# Patient Record
Sex: MALE | Race: WHITE | NOT HISPANIC OR LATINO
[De-identification: names, ages, dates, MRNs, and addresses within clinical notes are randomized per-mention and may not be internally consistent; named-entity substitution may affect disease eponyms.]

---

## 2021-05-10 PROBLEM — Z00.00 ENCOUNTER FOR PREVENTIVE HEALTH EXAMINATION: Status: ACTIVE | Noted: 2021-05-10

## 2023-05-05 PROBLEM — M25.512 LEFT SHOULDER PAIN: Status: ACTIVE | Noted: 2023-05-05

## 2023-05-05 NOTE — PHYSICAL EXAM
[de-identified] : Left Shoulder: No obvious deformities, atrophy, ecchymosis, or swelling/effusion. Negative tenderness to palpation of AC joint, clavicle, sternoclavicular joint, glenohumeral joint, rotator cuff, and proximal biceps/triceps. Normal active and passive range of motion, including flexion to 180 degrees, extension to 180 degrees, and internal/external rotation to 180 degrees. Negative provocative testing, including Hart, Neer's, Cross-Body Adduction, Prescott's, Speeds tests, Drop Arm, Empty Can, and Lift Off tests. Neurovascular status is intact.\par \par Otherwise, no apparent distress. Alert and oriented x 3. Normal mood and affect. No atrophy or swelling. 5 out of 5 motor strength. Sensation is intact and symmetrical. Normal deep tendon reflexes. Full range of motion of shoulder, elbows, hips, and knees (flexion, extension, and rotation). No palpatory tenderness or palpable lymph nodes. Negative straight leg raise. Normal finger-to-nose test. No pathological reflexes. Normal ambulation. No upper or lower extremity instability. [de-identified] : # views of ? are negative for any obvious fractures or dislocations. The patient understands that this is a wet read and I am not a radiologist. If the final read reveals something different than discussed in the office, then the patient will get a call back in the next 24 - 48 hours to discuss.

## 2023-05-05 NOTE — HISTORY OF PRESENT ILLNESS
[de-identified] : Mr. JERRY ELLIS is a 52 year male who presents to office complaining of left shoulder pain.\par \par All review of systems, family history, social history, surgical history, past medical history, medications, and allergies not previously stated as positive are negative. They were reviewed by me today with the patient and documented accordingly.

## 2023-05-05 NOTE — DISCUSSION/SUMMARY
[de-identified] : Diagnosis\par Medication Rx?\par Physical Therapy ordered?\par MRI ordered?\par Brace/splint/cast/crutches given?\par Cortisone injection or aspiration done?\par Pain management referral?\par Follow up with  ?\par All options discussed with patient.\par All questions by patient answered to their satisfaction.\par Patient expresses full understanding and agreement with plan.\par Patient is happy with the office visit.

## 2023-05-06 ENCOUNTER — APPOINTMENT (OUTPATIENT)
Dept: ORTHOPEDIC SURGERY | Facility: CLINIC | Age: 53
End: 2023-05-06

## 2023-05-06 DIAGNOSIS — M25.512 PAIN IN LEFT SHOULDER: ICD-10-CM

## 2023-05-11 ENCOUNTER — APPOINTMENT (OUTPATIENT)
Dept: ORTHOPEDIC SURGERY | Facility: CLINIC | Age: 53
End: 2023-05-11
Payer: COMMERCIAL

## 2023-05-11 VITALS — WEIGHT: 220 LBS | HEIGHT: 73 IN | BODY MASS INDEX: 29.16 KG/M2

## 2023-05-11 DIAGNOSIS — Z78.9 OTHER SPECIFIED HEALTH STATUS: ICD-10-CM

## 2023-05-11 DIAGNOSIS — M75.42 IMPINGEMENT SYNDROME OF LEFT SHOULDER: ICD-10-CM

## 2023-05-11 PROCEDURE — 73030 X-RAY EXAM OF SHOULDER: CPT | Mod: LT

## 2023-05-11 PROCEDURE — 20610 DRAIN/INJ JOINT/BURSA W/O US: CPT | Mod: LT

## 2023-05-11 PROCEDURE — 99203 OFFICE O/P NEW LOW 30 MIN: CPT | Mod: 25

## 2023-05-11 NOTE — DISCUSSION/SUMMARY
[de-identified] : "Written by Clementine Jaeger, acting as Scribe for Darryl Palmer MD."\par \par Dr. Palmer - \par The documentation recorded by the scribe accurately reflects the service I personally performed and the decisions made by me.

## 2023-05-11 NOTE — PHYSICAL EXAM
[NL (45)] : extension 45 degrees [NL (0-180)] : full passive forward flexion 0-180 degrees [NL (0-90)] : full external rotation 0-90 degrees [Supine] : supine [Normal Mood and Affect] : normal mood and affect [Able to Communicate] : able to communicate [Well Developed] : well developed [Well Nourished] : well nourished [Left] : left shoulder [There are no fractures, subluxations or dislocations. No significant abnormalities are seen] : There are no fractures, subluxations or dislocations. No significant abnormalities are seen [] : negative Hart [FreeTextEntry9] : IR T7, bilateral. [de-identified] : +Krissy

## 2023-05-11 NOTE — HISTORY OF PRESENT ILLNESS
[7] : 7 [2] : 2 [Dull/Aching] : dull/aching [Constant] : constant [Ice] : ice [Full time] : Work status: full time [de-identified] : 5/11/23  Initial visit for this 52 year male LHD here today for left shoulder pain that began about 3-4 weeks ago, he thinks after doing bench presses, but no particular injury.  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  Painful to lay on that arm, but without wake-up pain.  Reaching behind causes pain.   Advil 600 mg for pain, with a little relief. \par \par PMH:  No prior left shoulder issues.  [] : no [FreeTextEntry1] : left shoulder [de-identified] : lifting arm ,pushing [de-identified] : dentist [de-identified] : none